# Patient Record
Sex: MALE | Race: WHITE | NOT HISPANIC OR LATINO | URBAN - METROPOLITAN AREA
[De-identification: names, ages, dates, MRNs, and addresses within clinical notes are randomized per-mention and may not be internally consistent; named-entity substitution may affect disease eponyms.]

---

## 2024-08-08 ENCOUNTER — EMERGENCY (EMERGENCY)
Facility: HOSPITAL | Age: 39
LOS: 1 days | Discharge: HOME | End: 2024-08-08
Admitting: EMERGENCY MEDICINE
Payer: COMMERCIAL

## 2024-08-08 VITALS
SYSTOLIC BLOOD PRESSURE: 148 MMHG | TEMPERATURE: 98 F | RESPIRATION RATE: 20 BRPM | OXYGEN SATURATION: 98 % | HEART RATE: 96 BPM | DIASTOLIC BLOOD PRESSURE: 81 MMHG

## 2024-08-08 DIAGNOSIS — R07.81 PLEURODYNIA: ICD-10-CM
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
## 2024-08-08 DIAGNOSIS — S22.31XA FRACTURE OF ONE RIB, RIGHT SIDE, INITIAL ENCOUNTER FOR CLOSED FRACTURE: ICD-10-CM
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
## 2024-08-08 LAB
ALBUMIN SERPL ELPH-MCNC: 3.9 G/DL — SIGNIFICANT CHANGE UP (ref 3.4–5)
ALP SERPL-CCNC: 64 U/L — SIGNIFICANT CHANGE UP (ref 40–120)
ALT FLD-CCNC: 26 U/L — SIGNIFICANT CHANGE UP (ref 12–42)
ANION GAP SERPL CALC-SCNC: 8 MMOL/L — LOW (ref 9–16)
AST SERPL-CCNC: 19 U/L — SIGNIFICANT CHANGE UP (ref 15–37)
BASOPHILS # BLD AUTO: 0.03 K/UL — SIGNIFICANT CHANGE UP (ref 0–0.2)
BASOPHILS NFR BLD AUTO: 0.4 % — SIGNIFICANT CHANGE UP (ref 0–2)
BILIRUB SERPL-MCNC: 1.2 MG/DL — SIGNIFICANT CHANGE UP (ref 0.2–1.2)
BUN SERPL-MCNC: 10 MG/DL — SIGNIFICANT CHANGE UP (ref 7–23)
CALCIUM SERPL-MCNC: 8.8 MG/DL — SIGNIFICANT CHANGE UP (ref 8.5–10.5)
CHLORIDE SERPL-SCNC: 103 MMOL/L — SIGNIFICANT CHANGE UP (ref 96–108)
CO2 SERPL-SCNC: 27 MMOL/L — SIGNIFICANT CHANGE UP (ref 22–31)
CREAT SERPL-MCNC: 0.82 MG/DL — SIGNIFICANT CHANGE UP (ref 0.5–1.3)
EGFR: 115 ML/MIN/1.73M2 — SIGNIFICANT CHANGE UP
EOSINOPHIL # BLD AUTO: 0.1 K/UL — SIGNIFICANT CHANGE UP (ref 0–0.5)
EOSINOPHIL NFR BLD AUTO: 1.2 % — SIGNIFICANT CHANGE UP (ref 0–6)
GLUCOSE SERPL-MCNC: 91 MG/DL — SIGNIFICANT CHANGE UP (ref 70–99)
HCT VFR BLD CALC: 41 % — SIGNIFICANT CHANGE UP (ref 39–50)
HGB BLD-MCNC: 14.2 G/DL — SIGNIFICANT CHANGE UP (ref 13–17)
IMM GRANULOCYTES NFR BLD AUTO: 0.2 % — SIGNIFICANT CHANGE UP (ref 0–0.9)
LYMPHOCYTES # BLD AUTO: 2.68 K/UL — SIGNIFICANT CHANGE UP (ref 1–3.3)
LYMPHOCYTES # BLD AUTO: 31.3 % — SIGNIFICANT CHANGE UP (ref 13–44)
MCHC RBC-ENTMCNC: 32.9 PG — SIGNIFICANT CHANGE UP (ref 27–34)
MCHC RBC-ENTMCNC: 34.6 GM/DL — SIGNIFICANT CHANGE UP (ref 32–36)
MCV RBC AUTO: 94.9 FL — SIGNIFICANT CHANGE UP (ref 80–100)
MONOCYTES # BLD AUTO: 0.51 K/UL — SIGNIFICANT CHANGE UP (ref 0–0.9)
MONOCYTES NFR BLD AUTO: 6 % — SIGNIFICANT CHANGE UP (ref 2–14)
NEUTROPHILS # BLD AUTO: 5.22 K/UL — SIGNIFICANT CHANGE UP (ref 1.8–7.4)
NEUTROPHILS NFR BLD AUTO: 60.9 % — SIGNIFICANT CHANGE UP (ref 43–77)
NRBC # BLD: 0 /100 WBCS — SIGNIFICANT CHANGE UP (ref 0–0)
PLATELET # BLD AUTO: 216 K/UL — SIGNIFICANT CHANGE UP (ref 150–400)
POTASSIUM SERPL-MCNC: 4 MMOL/L — SIGNIFICANT CHANGE UP (ref 3.5–5.3)
POTASSIUM SERPL-SCNC: 4 MMOL/L — SIGNIFICANT CHANGE UP (ref 3.5–5.3)
PROT SERPL-MCNC: 7.4 G/DL — SIGNIFICANT CHANGE UP (ref 6.4–8.2)
RBC # BLD: 4.32 M/UL — SIGNIFICANT CHANGE UP (ref 4.2–5.8)
RBC # FLD: 12.7 % — SIGNIFICANT CHANGE UP (ref 10.3–14.5)
SODIUM SERPL-SCNC: 138 MMOL/L — SIGNIFICANT CHANGE UP (ref 132–145)
TROPONIN I, HIGH SENSITIVITY RESULT: <4 NG/L — SIGNIFICANT CHANGE UP
WBC # BLD: 8.56 K/UL — SIGNIFICANT CHANGE UP (ref 3.8–10.5)
WBC # FLD AUTO: 8.56 K/UL — SIGNIFICANT CHANGE UP (ref 3.8–10.5)

## 2024-08-08 PROCEDURE — 71101 X-RAY EXAM UNILAT RIBS/CHEST: CPT | Mod: 26,RT

## 2024-08-08 PROCEDURE — 71250 CT THORAX DX C-: CPT | Mod: 26,MC

## 2024-08-08 PROCEDURE — 99285 EMERGENCY DEPT VISIT HI MDM: CPT

## 2024-08-08 NOTE — ED PROVIDER NOTE - CLINICAL SUMMARY MEDICAL DECISION MAKING FREE TEXT BOX
38-year-old male, denies past medical history, presents to the ED complaining of right-sided rib pain after slip and fall up some stairs today. On exam patient is noted to have reproducible chest wall tenderness along the right anterior lower ribs.  Will plan to obtain x-ray to evaluate for possible fracture.  Patient offered pain control but refusing at this time.  Dispo pending medical workup.

## 2024-08-08 NOTE — ED ADULT NURSE NOTE - OBJECTIVE STATEMENT
Patient is a 39 y/o M c/o rib pain/injury. patient c/o right sided rib pain s/p trip and fall this afternoon around 1230. patient denies head strike but reports pain on inspiration. patient denies chest pain.

## 2024-08-08 NOTE — ED PROVIDER NOTE - PHYSICAL EXAMINATION
VITAL SIGNS: I have reviewed nursing notes and confirm.  CONSTITUTIONAL: Well-developed; well-nourished; in no acute distress.  SKIN: No acute rash.  HEAD: Normocephalic; atraumatic.  CARD: No extremity cyanosis. RRR, S1S2. +ttp along the R anterior ribs, level 5-9 w/ mild ttp along the posterior ribs of the same level.   RESP: Speaks in full, clear sentences. CTA sandra. No adventitious BS.  ABD: Soft and NT. No rebound.  EXT: Moves all extremities normally.  NEURO: Alert, oriented. Grossly unremarkable. No focal deficits. Fluent speech.   PSYCH: Cooperative, appropriate. Mood and affect wnl.

## 2024-08-08 NOTE — ED PROVIDER NOTE - PATIENT PORTAL LINK FT
You can access the FollowMyHealth Patient Portal offered by [facility] by registering at the following website: [email or web address]. By joining [facility]’s FollowMyHealth portal, you will also be able to view your health information using other applications (apps) compatible with our system.

## 2024-08-08 NOTE — ED PROVIDER NOTE - NS ED ROS FT
+rib pain  Denies fevers, chills, nausea, vomiting, diarrhea, constipation, abdominal pain, urinary symptoms, chest pain, palpitations, shortness of breath, dyspnea on exertion, syncope/near syncope, cough/URI symptoms, headache, weakness, numbness, focal deficits, visual changes, gait or balance changes, dizziness

## 2024-08-08 NOTE — ED ADULT TRIAGE NOTE - CHIEF COMPLAINT QUOTE
pt c/o r sided rib/chest wall pain s/p slip and fall on the stairs, states he landed on affected area. +pain with deep inspiration. denies head strike, denies any other injuries.

## 2024-08-08 NOTE — ED PROVIDER NOTE - OBJECTIVE STATEMENT
38-year-old male, denies past medical history, presents to the ED complaining of right-sided rib pain after slip and fall up some stairs today.  Denies head strike or LOC.  The pain is located along well right anterior lower ribs and worsened with deep breathing as well as with movement.  Patient has not taken any medications for pain relief prior to coming in.  Denies any shortness of breath, nausea, vomiting, fevers or chills.

## 2024-08-08 NOTE — ED PROVIDER NOTE - PROGRESS NOTE DETAILS
Concern for fxs on XR - will confirm w/ CT and obtain baseline labs/EKG. pt given incentive spirometer and lidoderm patch

## 2024-08-08 NOTE — ED PROVIDER NOTE - NSFOLLOWUPINSTRUCTIONS_ED_ALL_ED_FT
Rib Fracture  A rib fracture is a break or crack in one of the bones of the ribs. The ribs are long, curved bones that wrap around your chest and attach to your spine and your breastbone. The ribs protect your heart, lungs, and other organs in the chest.    ImageA broken or cracked rib is often painful but is not usually serious. Most rib fractures heal on their own over time. However, rib fractures can be more serious if multiple ribs are broken or if broken ribs move out of place and push against other structures or organs.    What are the causes?  This condition is caused by:    Repetitive movements with high force, such as pitching a baseball or having severe coughing spells.  A direct blow to the chest, such as a sports injury, a car accident, or a fall.  Cancer that has spread to the bones, which can weaken bones and cause them to break.    What are the signs or symptoms?  Symptoms of this condition include:    Pain when you breathe in or cough.  Pain when someone presses on the injured area.  Feeling short of breath.    How is this diagnosed?  This condition is diagnosed with a physical exam and medical history. Imaging tests may also be done, such as:    Chest X-ray.  CT scan.  MRI.  Bone scan.  Chest ultrasound.    How is this treated?  Treatment for this condition depends on the severity of the fracture. Most rib fractures usually heal on their own in 1–3 months. Sometimes healing takes longer if there is a cough that does not stop or if there are other activities that make the injury worse (aggravating factors). While you heal, you will be given medicines to control the pain. You will also be taught deep breathing exercises.    Severe injuries may require hospitalization or surgery.    Follow these instructions at home:  Managing pain, stiffness, and swelling     If directed, apply ice to the injured area.    Put ice in a plastic bag.  Place a towel between your skin and the bag.  Leave the ice on for 20 minutes, 2–3 times a day.    Take over-the-counter and prescription medicines only as told by your health care provider.  Activity     Avoid a lot of activity and any activities or movements that cause pain. Be careful during activities and avoid bumping the injured rib.  Slowly increase your activity as told by your health care provider.  General instructions     Do deep breathing exercises as told by your health care provider. This helps prevent pneumonia, which is a common complication of a broken rib. Your health care provider may instruct you to:    Take deep breaths several times a day.  Try to cough several times a day, holding a pillow against the injured area.  Use a device called incentive spirometer to practice deep breathing several times a day.    Drink enough fluid to keep your urine pale yellow.  Do not wear a rib belt or binder. These restrict breathing, which can lead to pneumonia.  Keep all follow-up visits as told by your health care provider. This is important.    Contact a health care provider if:  You have a fever.    Get help right away if:  You have difficulty breathing or you are short of breath.  You develop a cough that does not stop, or you cough up thick or bloody sputum.  You have nausea, vomiting, or pain in your abdomen.  Your pain gets worse and medicine does not help.    Summary  A rib fracture is a break or crack in one of the bones of the ribs.  A broken or cracked rib is often painful but is not usually serious.  Most rib fractures heal on their own over time.  Treatment for this condition depends on the severity of the fracture.  Avoid a lot of activity and any activities or movements that cause pain.

## 2024-08-09 RX ORDER — LIDOCAINE 5% 5 G/100G
1 CREAM TOPICAL ONCE
Refills: 0 | Status: DISCONTINUED | OUTPATIENT
Start: 2024-08-09 | End: 2024-08-12

## 2024-08-09 RX ORDER — IBUPROFEN 200 MG
600 TABLET ORAL ONCE
Refills: 0 | Status: DISCONTINUED | OUTPATIENT
Start: 2024-08-09 | End: 2024-08-12

## 2024-08-09 RX ORDER — LIDOCAINE 5% 5 G/100G
1 CREAM TOPICAL
Qty: 2 | Refills: 0
Start: 2024-08-09 | End: 2024-08-13